# Patient Record
Sex: MALE | Race: WHITE
[De-identification: names, ages, dates, MRNs, and addresses within clinical notes are randomized per-mention and may not be internally consistent; named-entity substitution may affect disease eponyms.]

---

## 2018-04-15 ENCOUNTER — TRANSCRIPTION ENCOUNTER (OUTPATIENT)
Age: 78
End: 2018-04-15

## 2018-04-16 ENCOUNTER — OUTPATIENT (OUTPATIENT)
Dept: OUTPATIENT SERVICES | Facility: HOSPITAL | Age: 78
LOS: 1 days | Discharge: HOME | End: 2018-04-16
Payer: MEDICARE

## 2018-04-16 VITALS
HEIGHT: 71 IN | SYSTOLIC BLOOD PRESSURE: 152 MMHG | TEMPERATURE: 98 F | HEART RATE: 53 BPM | RESPIRATION RATE: 15 BRPM | OXYGEN SATURATION: 100 % | DIASTOLIC BLOOD PRESSURE: 77 MMHG | WEIGHT: 214.29 LBS

## 2018-04-16 VITALS
DIASTOLIC BLOOD PRESSURE: 88 MMHG | RESPIRATION RATE: 17 BRPM | HEART RATE: 67 BPM | SYSTOLIC BLOOD PRESSURE: 130 MMHG | OXYGEN SATURATION: 97 %

## 2018-04-16 DIAGNOSIS — H33.021 RETINAL DETACHMENT WITH MULTIPLE BREAKS, RIGHT EYE: ICD-10-CM

## 2018-04-16 DIAGNOSIS — C44.229 SQUAMOUS CELL CARCINOMA OF SKIN OF LEFT EAR AND EXTERNAL AURICULAR CANAL: Chronic | ICD-10-CM

## 2018-04-16 DIAGNOSIS — Z98.890 OTHER SPECIFIED POSTPROCEDURAL STATES: Chronic | ICD-10-CM

## 2018-04-16 DIAGNOSIS — Z85.828 PERSONAL HISTORY OF OTHER MALIGNANT NEOPLASM OF SKIN: Chronic | ICD-10-CM

## 2018-04-16 DIAGNOSIS — Z98.41 CATARACT EXTRACTION STATUS, RIGHT EYE: Chronic | ICD-10-CM

## 2018-04-16 PROBLEM — Z00.00 ENCOUNTER FOR PREVENTIVE HEALTH EXAMINATION: Status: ACTIVE | Noted: 2018-04-16

## 2018-04-16 PROCEDURE — C1814: CPT

## 2018-04-16 PROCEDURE — 93005 ELECTROCARDIOGRAM TRACING: CPT

## 2018-04-16 PROCEDURE — C1784: CPT

## 2018-04-16 PROCEDURE — 67108 REPAIR DETACHED RETINA: CPT | Mod: RT

## 2018-04-16 PROCEDURE — 93010 ELECTROCARDIOGRAM REPORT: CPT

## 2018-04-16 PROCEDURE — C1889: CPT

## 2018-04-16 NOTE — ASU PATIENT PROFILE, ADULT - PSH
History of basal cell cancer    History of vitrectomy    S/P cataract surgery, right    Squamous cell cancer of external ear, left History of appendectomy    History of basal cell cancer    History of vitrectomy    S/P cataract surgery, right    Squamous cell cancer of external ear, left

## 2023-02-22 ENCOUNTER — OFFICE (OUTPATIENT)
Dept: URBAN - METROPOLITAN AREA CLINIC 8 | Facility: CLINIC | Age: 83
Setting detail: OPHTHALMOLOGY
End: 2023-02-22

## 2023-02-22 DIAGNOSIS — H90.3: ICD-10-CM

## 2023-02-22 PROCEDURE — LOSS LOSS AND DAMAGE: Performed by: AUDIOLOGIST

## 2023-09-11 ENCOUNTER — OFFICE (OUTPATIENT)
Dept: URBAN - METROPOLITAN AREA CLINIC 8 | Facility: CLINIC | Age: 83
Setting detail: OPHTHALMOLOGY
End: 2023-09-11

## 2023-09-11 DIAGNOSIS — H90.3: ICD-10-CM

## 2023-09-11 PROCEDURE — 92593 HEARING AID CHECK; BINAURAL: CPT | Performed by: AUDIOLOGIST

## 2023-10-25 ENCOUNTER — OFFICE (OUTPATIENT)
Dept: URBAN - METROPOLITAN AREA CLINIC 8 | Facility: CLINIC | Age: 83
Setting detail: OPHTHALMOLOGY
End: 2023-10-25

## 2023-10-25 DIAGNOSIS — H90.3: ICD-10-CM

## 2023-10-25 PROCEDURE — 92593 HEARING AID CHECK; BINAURAL: CPT | Performed by: AUDIOLOGIST

## 2024-08-22 ENCOUNTER — OFFICE (OUTPATIENT)
Dept: URBAN - METROPOLITAN AREA CLINIC 8 | Facility: CLINIC | Age: 84
Setting detail: OPHTHALMOLOGY
End: 2024-08-22
Payer: MEDICARE

## 2024-08-22 ENCOUNTER — RX ONLY (RX ONLY)
Age: 84
End: 2024-08-22

## 2024-08-22 DIAGNOSIS — H40.1121: ICD-10-CM

## 2024-08-22 DIAGNOSIS — H33.021: ICD-10-CM

## 2024-08-22 DIAGNOSIS — H40.1112: ICD-10-CM

## 2024-08-22 PROCEDURE — 92012 INTRM OPH EXAM EST PATIENT: CPT | Performed by: OPHTHALMOLOGY

## 2024-08-22 PROCEDURE — 92133 CPTRZD OPH DX IMG PST SGM ON: CPT | Performed by: OPHTHALMOLOGY

## 2024-08-22 PROCEDURE — 76514 ECHO EXAM OF EYE THICKNESS: CPT | Performed by: OPHTHALMOLOGY

## 2024-08-22 ASSESSMENT — CONFRONTATIONAL VISUAL FIELD TEST (CVF)
OD_FINDINGS: FULL
OS_FINDINGS: FULL

## 2024-08-27 ENCOUNTER — OFFICE (OUTPATIENT)
Dept: URBAN - METROPOLITAN AREA CLINIC 8 | Facility: CLINIC | Age: 84
Setting detail: OPHTHALMOLOGY
End: 2024-08-27

## 2024-08-27 DIAGNOSIS — H90.3: ICD-10-CM

## 2024-08-27 PROCEDURE — 92593 HEARING AID CHECK; BINAURAL: CPT | Performed by: AUDIOLOGIST

## 2024-10-04 ENCOUNTER — RX ONLY (RX ONLY)
Age: 84
End: 2024-10-04

## 2024-10-04 ENCOUNTER — OFFICE (OUTPATIENT)
Dept: URBAN - METROPOLITAN AREA CLINIC 8 | Facility: CLINIC | Age: 84
Setting detail: OPHTHALMOLOGY
End: 2024-10-04
Payer: MEDICARE

## 2024-10-04 DIAGNOSIS — H40.1121: ICD-10-CM

## 2024-10-04 DIAGNOSIS — H50.22: ICD-10-CM

## 2024-10-04 DIAGNOSIS — H52.4: ICD-10-CM

## 2024-10-04 DIAGNOSIS — Z96.1: ICD-10-CM

## 2024-10-04 DIAGNOSIS — H40.1112: ICD-10-CM

## 2024-10-04 PROCEDURE — 92083 EXTENDED VISUAL FIELD XM: CPT | Performed by: OPHTHALMOLOGY

## 2024-10-04 PROCEDURE — 99214 OFFICE O/P EST MOD 30 MIN: CPT | Performed by: OPHTHALMOLOGY

## 2024-10-04 PROCEDURE — 92015 DETERMINE REFRACTIVE STATE: CPT | Performed by: OPHTHALMOLOGY

## 2024-10-04 ASSESSMENT — KERATOMETRY
OD_K1POWER_DIOPTERS: 41.75
OD_AXISANGLE_DEGREES: 074
OD_K2POWER_DIOPTERS: 43.25
OS_AXISANGLE_DEGREES: 016
OS_K1POWER_DIOPTERS: 41.00
METHOD_AUTO_MANUAL: AUTO
OS_K2POWER_DIOPTERS: 43.25

## 2024-10-04 ASSESSMENT — REFRACTION_MANIFEST
OD_ADD: +3.00
OD_CYLINDER: -0.50
OD_VPRISM: BU
OD_ADD: +3.00
OS_VPRISM: BD
OS_SPHERE: -0.25
OD_AXIS: 165
OS_CYLINDER: -2.00
OD_SPHERE: -2.00
OS_VA1: 20/40
OD_CYLINDER: -0.50
OD_SPHERE: -2.00
OS_VA2: 20/25(J1)
OS_ADD: +3.00
OS_CYLINDER: -2.00
OS_VA1: 20/40
OD_VA2: 20/25(J1)
OD_VA1: HM
OU_VA: 20/40
OS_AXIS: 110
OD_VA2: 20/25(J1)
OS_VPRISM: BD
OD_AXIS: 165
OS_ADD: +3.00
OD_VA1: HM
OD_VPRISM: BU
OS_SPHERE: -0.25
OS_VA2: 20/25(J1)
OU_VA: 20/40
OS_AXIS: 110

## 2024-10-04 ASSESSMENT — TONOMETRY
OD_IOP_MMHG: 13
OS_IOP_MMHG: 16

## 2024-10-04 ASSESSMENT — REFRACTION_CURRENTRX
OD_VPRISM_DIRECTION: PROGS
OS_VPRISM_DIRECTION: PROGS
OS_AXIS: 120
OD_ADD: +2.25
OS_SPHERE: -0.50
OD_CYLINDER: -0.50
OS_AXIS: 120
OD_SPHERE: -2.00
OD_AXIS: 140
OS_VPRISM: BD
OS_CYLINDER: -1.75
OS_OVR_VA: 20/
OD_AXIS: 140
OD_CYLINDER: -0.50
OD_VPRISM: BU
OD_SPHERE: -2.00
OD_VPRISM_DIRECTION: PROGS
OS_CYLINDER: -1.75
OD_OVR_VA: 20/
OD_ADD: +2.25
OS_VPRISM_DIRECTION: PROGS
OS_ADD: +2.25
OS_SPHERE: -0.50
OS_OVR_VA: 20/
OS_ADD: +2.25
OD_OVR_VA: 20/

## 2024-10-04 ASSESSMENT — VISUAL ACUITY
OD_BCVA: 20/40-1
OS_BCVA: HM

## 2024-10-04 ASSESSMENT — PACHYMETRY
OD_CT_CORRECTION: 3
OD_CT_UM: 501
OS_CT_CORRECTION: 2
OS_CT_UM: 518

## 2024-10-04 ASSESSMENT — REFRACTION_AUTOREFRACTION
OS_AXIS: 110
OD_AXIS: 166
OS_CYLINDER: -2.25
OD_CYLINDER: -1.00
OD_SPHERE: -1.75
OS_SPHERE: -0.50

## 2024-10-04 ASSESSMENT — CONFRONTATIONAL VISUAL FIELD TEST (CVF)
OD_FINDINGS: FULL
OS_FINDINGS: FULL

## 2025-01-03 ENCOUNTER — OFFICE (OUTPATIENT)
Dept: URBAN - METROPOLITAN AREA CLINIC 8 | Facility: CLINIC | Age: 85
Setting detail: OPHTHALMOLOGY
End: 2025-01-03
Payer: MEDICARE

## 2025-01-03 DIAGNOSIS — H40.1112: ICD-10-CM

## 2025-01-03 DIAGNOSIS — H40.1121: ICD-10-CM

## 2025-01-03 PROCEDURE — 92133 CPTRZD OPH DX IMG PST SGM ON: CPT | Performed by: OPHTHALMOLOGY

## 2025-01-03 PROCEDURE — 99213 OFFICE O/P EST LOW 20 MIN: CPT | Performed by: OPHTHALMOLOGY

## 2025-01-03 ASSESSMENT — REFRACTION_MANIFEST
OD_SPHERE: -2.00
OD_SPHERE: -2.00
OD_AXIS: 165
OS_ADD: +3.00
OD_VPRISM: BU
OS_VA1: 20/40
OS_AXIS: 110
OD_ADD: +3.00
OS_VA1: 20/40
OD_VA2: 20/25(J1)
OU_VA: 20/40
OD_ADD: +3.00
OD_VA1: HM
OS_CYLINDER: -2.00
OD_VA1: HM
OS_VA2: 20/25(J1)
OS_ADD: +3.00
OD_CYLINDER: -0.50
OS_SPHERE: -0.25
OD_VPRISM: BU
OS_VA2: 20/25(J1)
OS_CYLINDER: -2.00
OU_VA: 20/40
OS_SPHERE: -0.25
OS_AXIS: 110
OS_VPRISM: BD
OD_VA2: 20/25(J1)
OD_CYLINDER: -0.50
OS_VPRISM: BD
OD_AXIS: 165

## 2025-01-03 ASSESSMENT — REFRACTION_CURRENTRX
OD_VPRISM_DIRECTION: PROGS
OS_AXIS: 120
OS_CYLINDER: -1.75
OD_OVR_VA: 20/
OS_VPRISM_DIRECTION: PROGS
OS_OVR_VA: 20/
OS_ADD: +2.25
OS_VPRISM_DIRECTION: PROGS
OD_AXIS: 140
OD_SPHERE: -2.00
OD_SPHERE: -2.00
OD_VPRISM: BU
OD_CYLINDER: -0.50
OS_ADD: +2.25
OD_AXIS: 140
OS_SPHERE: -0.50
OD_VPRISM_DIRECTION: PROGS
OS_AXIS: 120
OS_OVR_VA: 20/
OS_VPRISM: BD
OD_OVR_VA: 20/
OD_ADD: +2.25
OS_SPHERE: -0.50
OD_CYLINDER: -0.50
OS_CYLINDER: -1.75
OD_ADD: +2.25

## 2025-01-03 ASSESSMENT — REFRACTION_AUTOREFRACTION
OS_AXIS: 110
OS_SPHERE: -0.50
OD_SPHERE: -1.75
OD_AXIS: 166
OD_CYLINDER: -1.00
OS_CYLINDER: -2.25

## 2025-01-03 ASSESSMENT — KERATOMETRY
METHOD_AUTO_MANUAL: AUTO
OD_AXISANGLE_DEGREES: 074
OS_AXISANGLE_DEGREES: 016
OS_K1POWER_DIOPTERS: 41.00
OD_K1POWER_DIOPTERS: 41.75
OS_K2POWER_DIOPTERS: 43.25
OD_K2POWER_DIOPTERS: 43.25

## 2025-01-03 ASSESSMENT — TONOMETRY
OS_IOP_MMHG: 16
OD_IOP_MMHG: 18

## 2025-01-03 ASSESSMENT — CONFRONTATIONAL VISUAL FIELD TEST (CVF)
OD_FINDINGS: FULL
OS_FINDINGS: FULL

## 2025-01-03 ASSESSMENT — PACHYMETRY
OD_CT_CORRECTION: 3
OS_CT_CORRECTION: 2
OD_CT_UM: 501
OS_CT_UM: 518

## 2025-01-03 ASSESSMENT — VISUAL ACUITY
OS_BCVA: HM
OD_BCVA: 20/40-1

## 2025-02-20 ENCOUNTER — OFFICE (OUTPATIENT)
Dept: URBAN - METROPOLITAN AREA CLINIC 38 | Facility: CLINIC | Age: 85
Setting detail: OPHTHALMOLOGY
End: 2025-02-20

## 2025-02-20 DIAGNOSIS — H90.3: ICD-10-CM

## 2025-02-20 PROCEDURE — 92593 HEARING AID CHECK; BINAURAL: CPT | Performed by: AUDIOLOGIST

## 2025-04-04 ENCOUNTER — OFFICE (OUTPATIENT)
Dept: URBAN - METROPOLITAN AREA CLINIC 8 | Facility: CLINIC | Age: 85
Setting detail: OPHTHALMOLOGY
End: 2025-04-04
Payer: MEDICARE

## 2025-04-04 DIAGNOSIS — H40.1121: ICD-10-CM

## 2025-04-04 DIAGNOSIS — H40.1112: ICD-10-CM

## 2025-04-04 PROCEDURE — 99213 OFFICE O/P EST LOW 20 MIN: CPT | Performed by: OPHTHALMOLOGY

## 2025-04-04 ASSESSMENT — REFRACTION_MANIFEST
OS_VPRISM: BD
OS_CYLINDER: -2.00
OS_AXIS: 110
OS_ADD: +3.00
OS_VA2: 20/25(J1)
OD_SPHERE: -2.00
OD_SPHERE: -2.00
OD_VA2: 20/25(J1)
OS_VA2: 20/25(J1)
OD_VA2: 20/25(J1)
OD_CYLINDER: -0.50
OS_VA1: 20/40
OU_VA: 20/40
OS_VA1: 20/40
OS_CYLINDER: -2.00
OD_ADD: +3.00
OD_VA1: HM
OS_AXIS: 110
OS_ADD: +3.00
OS_VPRISM: BD
OD_AXIS: 165
OU_VA: 20/40
OS_SPHERE: -0.25
OD_VPRISM: BU
OD_VA1: HM
OD_ADD: +3.00
OD_VPRISM: BU
OS_SPHERE: -0.25
OD_AXIS: 165
OD_CYLINDER: -0.50

## 2025-04-04 ASSESSMENT — REFRACTION_CURRENTRX
OD_AXIS: 140
OD_ADD: +2.25
OS_SPHERE: -0.50
OD_VPRISM_DIRECTION: PROGS
OD_ADD: +2.25
OD_SPHERE: -2.00
OD_SPHERE: -2.00
OD_CYLINDER: -0.50
OS_AXIS: 120
OS_OVR_VA: 20/
OD_OVR_VA: 20/
OS_AXIS: 120
OS_ADD: +2.25
OS_VPRISM_DIRECTION: PROGS
OD_VPRISM_DIRECTION: PROGS
OS_VPRISM: BD
OD_OVR_VA: 20/
OS_CYLINDER: -1.75
OS_ADD: +2.25
OS_OVR_VA: 20/
OD_VPRISM: BU
OS_SPHERE: -0.50
OD_CYLINDER: -0.50
OS_VPRISM_DIRECTION: PROGS
OD_AXIS: 140
OS_CYLINDER: -1.75

## 2025-04-04 ASSESSMENT — REFRACTION_AUTOREFRACTION
OD_CYLINDER: -1.00
OD_AXIS: 166
OS_CYLINDER: -2.25
OS_SPHERE: -0.50
OD_SPHERE: -1.75
OS_AXIS: 110

## 2025-04-04 ASSESSMENT — TONOMETRY
OD_IOP_MMHG: 12
OS_IOP_MMHG: 09

## 2025-04-04 ASSESSMENT — PACHYMETRY
OD_CT_CORRECTION: 3
OS_CT_CORRECTION: 2
OD_CT_UM: 501
OS_CT_UM: 518

## 2025-04-04 ASSESSMENT — KERATOMETRY
OS_K2POWER_DIOPTERS: 43.25
OS_AXISANGLE_DEGREES: 016
OD_AXISANGLE_DEGREES: 074
METHOD_AUTO_MANUAL: AUTO
OD_K2POWER_DIOPTERS: 43.25
OS_K1POWER_DIOPTERS: 41.00
OD_K1POWER_DIOPTERS: 41.75

## 2025-04-04 ASSESSMENT — CONFRONTATIONAL VISUAL FIELD TEST (CVF)
OS_FINDINGS: FULL
OD_FINDINGS: FULL

## 2025-04-04 ASSESSMENT — VISUAL ACUITY
OS_BCVA: HM
OD_BCVA: 20/30-

## 2025-05-02 ENCOUNTER — OFFICE (OUTPATIENT)
Dept: URBAN - METROPOLITAN AREA CLINIC 8 | Facility: CLINIC | Age: 85
Setting detail: OPHTHALMOLOGY
End: 2025-05-02
Payer: MEDICARE

## 2025-05-02 DIAGNOSIS — H02.821: ICD-10-CM

## 2025-05-02 PROCEDURE — 11440 EXC FACE-MM B9+MARG 0.5 CM/<: CPT | Mod: E3 | Performed by: OPHTHALMOLOGY

## 2025-05-02 ASSESSMENT — REFRACTION_MANIFEST
OS_SPHERE: -0.25
OS_CYLINDER: -2.00
OD_CYLINDER: -0.50
OS_CYLINDER: -2.00
OD_VPRISM: BU
OD_AXIS: 165
OD_VA2: 20/25(J1)
OS_VPRISM: BD
OS_SPHERE: -0.25
OD_AXIS: 165
OD_VA1: HM
OS_ADD: +3.00
OD_CYLINDER: -0.50
OD_VA1: HM
OS_AXIS: 110
OS_AXIS: 110
OU_VA: 20/40
OD_VA2: 20/25(J1)
OD_ADD: +3.00
OD_VPRISM: BU
OS_VA2: 20/25(J1)
OS_ADD: +3.00
OS_VA1: 20/40
OS_VPRISM: BD
OU_VA: 20/40
OS_VA1: 20/40
OS_VA2: 20/25(J1)
OD_ADD: +3.00
OD_SPHERE: -2.00
OD_SPHERE: -2.00

## 2025-05-02 ASSESSMENT — REFRACTION_CURRENTRX
OS_AXIS: 120
OS_SPHERE: -0.50
OD_CYLINDER: -0.50
OS_OVR_VA: 20/
OD_VPRISM: BU
OS_CYLINDER: -1.75
OS_OVR_VA: 20/
OD_VPRISM_DIRECTION: PROGS
OS_ADD: +2.25
OD_AXIS: 140
OS_AXIS: 120
OD_CYLINDER: -0.50
OD_ADD: +2.25
OS_VPRISM_DIRECTION: PROGS
OS_CYLINDER: -1.75
OD_AXIS: 140
OS_VPRISM_DIRECTION: PROGS
OD_SPHERE: -2.00
OS_VPRISM: BD
OD_ADD: +2.25
OS_ADD: +2.25
OD_VPRISM_DIRECTION: PROGS
OD_OVR_VA: 20/
OD_SPHERE: -2.00
OS_SPHERE: -0.50
OD_OVR_VA: 20/

## 2025-05-02 ASSESSMENT — REFRACTION_AUTOREFRACTION
OD_AXIS: 166
OD_SPHERE: -1.75
OS_SPHERE: -0.50
OD_CYLINDER: -1.00
OS_CYLINDER: -2.25
OS_AXIS: 110

## 2025-05-02 ASSESSMENT — CONFRONTATIONAL VISUAL FIELD TEST (CVF)
OS_FINDINGS: FULL
OD_FINDINGS: FULL

## 2025-05-02 ASSESSMENT — KERATOMETRY
OD_K2POWER_DIOPTERS: 43.25
OS_AXISANGLE_DEGREES: 016
OS_K1POWER_DIOPTERS: 41.00
OS_K2POWER_DIOPTERS: 43.25
OD_AXISANGLE_DEGREES: 074
OD_K1POWER_DIOPTERS: 41.75
METHOD_AUTO_MANUAL: AUTO

## 2025-05-02 ASSESSMENT — PACHYMETRY
OS_CT_CORRECTION: 2
OD_CT_UM: 501
OS_CT_UM: 518
OD_CT_CORRECTION: 3

## 2025-05-02 ASSESSMENT — VISUAL ACUITY
OS_BCVA: HM
OD_BCVA: 20/40

## 2025-05-02 ASSESSMENT — TONOMETRY
OD_IOP_MMHG: 17
OS_IOP_MMHG: 18

## 2025-05-13 ENCOUNTER — OFFICE (OUTPATIENT)
Dept: URBAN - METROPOLITAN AREA CLINIC 8 | Facility: CLINIC | Age: 85
Setting detail: OPHTHALMOLOGY
End: 2025-05-13

## 2025-05-13 DIAGNOSIS — H90.3: ICD-10-CM

## 2025-05-13 PROCEDURE — 92593 HEARING AID CHECK; BINAURAL: CPT | Performed by: AUDIOLOGIST

## 2025-05-14 ENCOUNTER — OFFICE (OUTPATIENT)
Dept: URBAN - METROPOLITAN AREA CLINIC 8 | Facility: CLINIC | Age: 85
Setting detail: OPHTHALMOLOGY
End: 2025-05-14

## 2025-05-14 DIAGNOSIS — H90.3: ICD-10-CM

## 2025-05-14 PROCEDURE — 92593 HEARING AID CHECK; BINAURAL: CPT | Performed by: AUDIOLOGIST

## 2025-06-12 ENCOUNTER — OFFICE (OUTPATIENT)
Dept: URBAN - METROPOLITAN AREA CLINIC 8 | Facility: CLINIC | Age: 85
Setting detail: OPHTHALMOLOGY
End: 2025-06-12
Payer: MEDICARE

## 2025-06-12 DIAGNOSIS — H02.821: ICD-10-CM

## 2025-06-12 DIAGNOSIS — H40.1112: ICD-10-CM

## 2025-06-12 DIAGNOSIS — H40.1121: ICD-10-CM

## 2025-06-12 PROCEDURE — 99213 OFFICE O/P EST LOW 20 MIN: CPT | Performed by: OPHTHALMOLOGY

## 2025-06-12 ASSESSMENT — PACHYMETRY
OD_CT_CORRECTION: 3
OS_CT_UM: 518
OS_CT_CORRECTION: 2
OD_CT_UM: 501

## 2025-06-12 ASSESSMENT — TONOMETRY
OD_IOP_MMHG: 17
OS_IOP_MMHG: 14

## 2025-06-12 ASSESSMENT — CONFRONTATIONAL VISUAL FIELD TEST (CVF)
OD_FINDINGS: FULL
OS_FINDINGS: FULL

## 2025-06-13 ASSESSMENT — REFRACTION_MANIFEST
OD_ADD: +3.00
OD_VA2: 20/25(J1)
OS_VA2: 20/25(J1)
OS_VA2: 20/25(J1)
OD_CYLINDER: -0.50
OD_SPHERE: -2.00
OU_VA: 20/40
OD_SPHERE: -2.00
OS_ADD: +3.00
OS_VA1: 20/40
OD_VA2: 20/25(J1)
OS_CYLINDER: -2.00
OS_SPHERE: -0.25
OD_VPRISM: BU
OS_CYLINDER: -2.00
OD_VA1: HM
OD_CYLINDER: -0.50
OD_AXIS: 165
OD_VA1: HM
OS_VPRISM: BD
OS_AXIS: 110
OS_ADD: +3.00
OS_SPHERE: -0.25
OD_AXIS: 165
OS_VA1: 20/40
OS_AXIS: 110
OD_ADD: +3.00
OU_VA: 20/40
OD_VPRISM: BU
OS_VPRISM: BD

## 2025-06-13 ASSESSMENT — KERATOMETRY
OD_K1POWER_DIOPTERS: 42.25
OS_K2POWER_DIOPTERS: 43.75
OS_AXISANGLE_DEGREES: 027
METHOD_AUTO_MANUAL: AUTO
OS_K1POWER_DIOPTERS: 41.75
OD_K2POWER_DIOPTERS: 43.25
OD_AXISANGLE_DEGREES: 085

## 2025-06-13 ASSESSMENT — REFRACTION_CURRENTRX
OS_AXIS: 120
OD_AXIS: 140
OS_CYLINDER: -1.75
OD_VPRISM_DIRECTION: PROGS
OS_VPRISM: BD
OS_OVR_VA: 20/
OD_OVR_VA: 20/
OS_SPHERE: -0.50
OS_SPHERE: -0.50
OS_OVR_VA: 20/
OS_ADD: +2.25
OS_VPRISM_DIRECTION: PROGS
OD_VPRISM: BU
OD_VPRISM_DIRECTION: PROGS
OD_OVR_VA: 20/
OD_AXIS: 140
OS_VPRISM_DIRECTION: PROGS
OD_CYLINDER: -0.50
OD_CYLINDER: -0.50
OD_ADD: +2.25
OS_CYLINDER: -1.75
OS_ADD: +2.25
OD_SPHERE: -2.00
OD_ADD: +2.25
OD_SPHERE: -2.00
OS_AXIS: 120

## 2025-06-13 ASSESSMENT — VISUAL ACUITY: OD_BCVA: 20/40+2

## 2025-06-13 ASSESSMENT — REFRACTION_AUTOREFRACTION
OD_AXIS: 160
OS_CYLINDER: -2.25
OS_SPHERE: -0.25
OD_CYLINDER: -0.50
OS_AXIS: 113
OD_SPHERE: -1.75